# Patient Record
Sex: FEMALE | Race: BLACK OR AFRICAN AMERICAN | NOT HISPANIC OR LATINO | URBAN - METROPOLITAN AREA
[De-identification: names, ages, dates, MRNs, and addresses within clinical notes are randomized per-mention and may not be internally consistent; named-entity substitution may affect disease eponyms.]

---

## 2023-07-27 ENCOUNTER — OFFICE VISIT (OUTPATIENT)
Dept: URBAN - METROPOLITAN AREA CLINIC 40 | Facility: CLINIC | Age: 68
End: 2023-07-27

## 2023-07-27 NOTE — HPI-TODAY'S VISIT:
Patient was last seen in 2017.  She has a history of gastroesophageal reflux and iron deficiency anemia.  She underwent EGD and colonoscopy on 3/20/2017 the EGD revealed esophagitis and mild gastritis.  Duodenal biopsies for celiac were unremarkable gastric and esophageal biopsies revealed benign inflammation.  Patient's colonoscopy revealed sigmoid diverticulosis, and was otherwise normal.

## 2023-08-16 ENCOUNTER — OFFICE VISIT (OUTPATIENT)
Dept: URBAN - METROPOLITAN AREA CLINIC 40 | Facility: CLINIC | Age: 68
End: 2023-08-16

## 2023-08-16 RX ORDER — METFORMIN HYDROCHLORIDE 500 MG/1
1 TABLET WITH A MEAL TABLET, FILM COATED ORAL ONCE A DAY
Status: ACTIVE | COMMUNITY

## 2023-08-16 RX ORDER — OMEPRAZOLE 40 MG/1
1 CAPSULE 30 MINUTES BEFORE MORNING MEAL CAPSULE, DELAYED RELEASE ORAL ONCE A DAY
Status: ACTIVE | COMMUNITY

## 2023-08-16 RX ORDER — ATENOLOL AND CHLORTHALIDONE 50; 25 MG/1; MG/1
1 TABLET TABLET ORAL ONCE A DAY
Status: ACTIVE | COMMUNITY

## 2023-08-16 NOTE — HPI-TODAY'S VISIT:
68-year old female patient known to Dr. Hanson no showed last office visit July 27, 2023.  Colonoscopy EGD March 2017 EGD showed esophagitis, patchy mild inflammation in gastric fundus gastric body and gastric antrum duodenal bulb and second part of the duodenum were normal grade a esophagitis.  Pathology showed inflammation no infection.  Colonoscopy showed multiple large and small mouth diverticula in sigmoid colon otherwise no abnormality no specimens collected repeat colon in 10 years.  Recent labs [facility] July 24, 2023 CMP unremarkable elevated lipid profile CBC hemoglobin 11.8 hematocrit 38.4 otherwise unremarkable UA from August 9, 2023 negative for UTI.

## 2023-09-13 ENCOUNTER — OFFICE VISIT (OUTPATIENT)
Dept: URBAN - METROPOLITAN AREA CLINIC 40 | Facility: CLINIC | Age: 68
End: 2023-09-13
Payer: MEDICARE

## 2023-09-13 ENCOUNTER — DASHBOARD ENCOUNTERS (OUTPATIENT)
Age: 68
End: 2023-09-13

## 2023-09-13 ENCOUNTER — WEB ENCOUNTER (OUTPATIENT)
Dept: URBAN - METROPOLITAN AREA CLINIC 40 | Facility: CLINIC | Age: 68
End: 2023-09-13

## 2023-09-13 VITALS
TEMPERATURE: 97.5 F | WEIGHT: 173.6 LBS | HEIGHT: 64 IN | SYSTOLIC BLOOD PRESSURE: 130 MMHG | DIASTOLIC BLOOD PRESSURE: 80 MMHG | HEART RATE: 77 BPM | BODY MASS INDEX: 29.64 KG/M2

## 2023-09-13 DIAGNOSIS — R19.8 STOMACH GROWLING: ICD-10-CM

## 2023-09-13 DIAGNOSIS — K21.9 GASTROESOPHAGEAL REFLUX: ICD-10-CM

## 2023-09-13 DIAGNOSIS — K29.70 GASTRITIS: ICD-10-CM

## 2023-09-13 PROCEDURE — 99202 OFFICE O/P NEW SF 15 MIN: CPT | Performed by: NURSE PRACTITIONER

## 2023-09-13 RX ORDER — METFORMIN HYDROCHLORIDE 500 MG/1
1 TABLET WITH A MEAL TABLET, FILM COATED ORAL ONCE A DAY
Status: ACTIVE | COMMUNITY

## 2023-09-13 RX ORDER — OMEPRAZOLE 40 MG/1
1 CAPSULE 30 MINUTES BEFORE MORNING MEAL CAPSULE, DELAYED RELEASE ORAL ONCE A DAY
Status: ACTIVE | COMMUNITY

## 2023-09-13 RX ORDER — ATENOLOL AND CHLORTHALIDONE 50; 25 MG/1; MG/1
1 TABLET TABLET ORAL ONCE A DAY
Status: ACTIVE | COMMUNITY

## 2023-09-13 NOTE — HPI-TODAY'S VISIT:
68-year old female patient with PMH as listed below, known to Dr. He. Colonoscopy/ EGD March 2017. EGD showed esophagitis, patchy mild inflammation in gastric fundus gastric body and gastric antrum , duodenal bulb and second part of the duodenum were normal, LA Grade A esophagitis. Pathology showed inflammation, no infection. Colonoscopy showed multiple large and small mouth diverticula in sigmoid colon otherwise no abnormality, no specimens collected, Repeat colon in 10 years. Recent labs [facility] July 24, 2023 CMP unremarkable, elevated lipid profile, CBC hemoglobin 11.8, hematocrit 38.4 otherwise unremarkable. UA from August 9, 2023 negative for UTI.  The patent presents today with having had the same concerns she had last time she saw Dr. He. Growling, loud stomach for 5-6 years  that wakes her  up and no appetite. She has seen multiple Dr's and undergone several GES, labs, all workup has been negative. She has been checked out for cardiac and liver and had no findings. She denies hematochezia, diarrhea, constipation, abdominal pain, dysphagia, belching. Reflux is well controlled with omeprazole. She has started drinking a small glass of red wine as advised by PCP. She states her BMs are regular. She said she had to take antibiotics for something for 7 days , 6 weeks ago and the growling noises stopped. She started to cancel her appointment today but decided to keep it just in case. Discussed with her that as her symptoms have resolved would not recommend EGD at present. Offered stool studies for bacteria and H. Pylori, she declined and said if her symptoms come back she will consider it and call back. No other concerns or complaints. GERD diet discussed.

## 2024-10-14 ENCOUNTER — OFFICE VISIT (OUTPATIENT)
Dept: URBAN - METROPOLITAN AREA CLINIC 40 | Facility: CLINIC | Age: 69
End: 2024-10-14

## 2024-10-14 RX ORDER — METFORMIN HYDROCHLORIDE 500 MG/1
1 TABLET WITH A MEAL TABLET, FILM COATED ORAL ONCE A DAY
Status: ACTIVE | COMMUNITY

## 2024-10-14 RX ORDER — OMEPRAZOLE 40 MG/1
1 CAPSULE 30 MINUTES BEFORE MORNING MEAL CAPSULE, DELAYED RELEASE ORAL ONCE A DAY
Status: ACTIVE | COMMUNITY

## 2024-10-14 RX ORDER — ATENOLOL AND CHLORTHALIDONE 50; 25 MG/1; MG/1
1 TABLET TABLET ORAL ONCE A DAY
Status: ACTIVE | COMMUNITY